# Patient Record
Sex: MALE | Race: BLACK OR AFRICAN AMERICAN | NOT HISPANIC OR LATINO | Employment: FULL TIME | URBAN - METROPOLITAN AREA
[De-identification: names, ages, dates, MRNs, and addresses within clinical notes are randomized per-mention and may not be internally consistent; named-entity substitution may affect disease eponyms.]

---

## 2019-07-27 ENCOUNTER — HOSPITAL ENCOUNTER (EMERGENCY)
Facility: OTHER | Age: 49
Discharge: HOME | End: 2019-07-27
Attending: EMERGENCY MEDICINE

## 2019-07-27 VITALS
WEIGHT: 190 LBS | SYSTOLIC BLOOD PRESSURE: 167 MMHG | OXYGEN SATURATION: 100 % | TEMPERATURE: 99 F | DIASTOLIC BLOOD PRESSURE: 81 MMHG | HEART RATE: 81 BPM | RESPIRATION RATE: 16 BRPM
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
## 2019-07-27 DIAGNOSIS — S81.852A DOG BITE OF LEFT LOWER LEG, INITIAL ENCOUNTER: Primary | ICD-10-CM
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
## 2019-07-27 PROCEDURE — 90715 TDAP VACCINE 7 YRS/> IM: CPT | Performed by: PHYSICIAN ASSISTANT

## 2019-07-27 PROCEDURE — 63600175 PHARM REV CODE 636 W HCPCS: Performed by: PHYSICIAN ASSISTANT

## 2019-07-27 PROCEDURE — 99283 EMERGENCY DEPT VISIT LOW MDM: CPT | Mod: 25

## 2019-07-27 PROCEDURE — 90471 IMMUNIZATION ADMIN: CPT | Performed by: PHYSICIAN ASSISTANT

## 2019-07-27 PROCEDURE — 25000003 PHARM REV CODE 250: Performed by: PHYSICIAN ASSISTANT

## 2019-07-27 RX ORDER — MUPIROCIN 20 MG/G
OINTMENT TOPICAL 3 TIMES DAILY
Qty: 22 G | Refills: 0 | Status: SHIPPED | OUTPATIENT
Start: 2019-07-27 | End: 2019-08-06

## 2019-07-27 RX ORDER — AMOXICILLIN AND CLAVULANATE POTASSIUM 875; 125 MG/1; MG/1
1 TABLET, FILM COATED ORAL
Status: COMPLETED | OUTPATIENT
Start: 2019-07-27 | End: 2019-07-27

## 2019-07-27 RX ORDER — AMOXICILLIN AND CLAVULANATE POTASSIUM 875; 125 MG/1; MG/1
1 TABLET, FILM COATED ORAL 2 TIMES DAILY
Qty: 14 TABLET | Refills: 0 | Status: SHIPPED | OUTPATIENT
Start: 2019-07-27 | End: 2019-08-03

## 2019-07-27 RX ADMIN — AMOXICILLIN AND CLAVULANATE POTASSIUM 1 TABLET: 875; 125 TABLET, FILM COATED ORAL at 07:07

## 2019-07-27 RX ADMIN — BACITRACIN ZINC, NEOMYCIN SULFATE, AND POLYMYXIN B SULFATE 1 EACH: 400; 3.5; 5 OINTMENT TOPICAL at 07:07

## 2019-07-27 RX ADMIN — CLOSTRIDIUM TETANI TOXOID ANTIGEN (FORMALDEHYDE INACTIVATED), CORYNEBACTERIUM DIPHTHERIAE TOXOID ANTIGEN (FORMALDEHYDE INACTIVATED), BORDETELLA PERTUSSIS TOXOID ANTIGEN (GLUTARALDEHYDE INACTIVATED), BORDETELLA PERTUSSIS FILAMENTOUS HEMAGGLUTININ ANTIGEN (FORMALDEHYDE INACTIVATED), BORDETELLA PERTUSSIS PERTACTIN ANTIGEN, AND BORDETELLA PERTUSSIS FIMBRIAE 2/3 ANTIGEN 0.5 ML: 5; 2; 2.5; 5; 3; 5 INJECTION, SUSPENSION INTRAMUSCULAR at 07:07

## 2019-07-27 NOTE — ED TRIAGE NOTES
"Pt presents to ER states "I was bit by a pit bull".  Pt reports being bit today in the Left calf around 3pm.  Pain 8/10.  Pt denies chest pain, sob, n/v/diarrhea.  "

## 2019-07-27 NOTE — ED PROVIDER NOTES
Encounter Date: 7/27/2019       History     Chief Complaint   Patient presents with    Animal Bite     two puncture wounds noted to left calf after getting bit by pit approx 3 hours PTA. Dog UTD on shots, pt not UTD on Tdap     49-year-old male with history of hypertension presents to the emergency department with his significant other with complaints of dog bite to the left calf.  He states that the injury occurred approximately 3 hr prior to arrival.  He states that he was in the woods at a friend's house smoking marijuana when his friend's pit bull bit the back of his leg.  He states that he was told that the dog's vaccines are up-to-date.  He admits that his tetanus vaccine is not up-to-date.  He admits applying liquid Band-Aid over the wound to stop the infection from getting in.  He reports pain at an 8/10. Patient denies any changes in the dog's behavior, foaming at the mouth. States that he is unsure why the dog bit him and unable to elaborate further on event    The history is provided by the patient and the spouse.     Review of patient's allergies indicates:  No Known Allergies  Past Medical History:   Diagnosis Date    Hypertension      History reviewed. No pertinent surgical history.  History reviewed. No pertinent family history.  Social History     Tobacco Use    Smoking status: Never Smoker   Substance Use Topics    Alcohol use: Yes    Drug use: Yes     Types: Marijuana     Review of Systems   Constitutional: Negative for chills and fever.   Respiratory: Negative for shortness of breath.    Cardiovascular: Negative for chest pain.   Genitourinary: Negative for dysuria.   Skin: Positive for wound (dog bite left calf). Negative for color change and rash.   Neurological: Negative for weakness and numbness.   Hematological: Does not bruise/bleed easily.       Physical Exam     Initial Vitals [07/27/19 1824]   BP Pulse Resp Temp SpO2   136/81 70 16 99 °F (37.2 °C) --      MAP       --          Physical Exam    Nursing note and vitals reviewed.  Constitutional: Vital signs are normal. He appears well-developed and well-nourished. He is not diaphoretic.  Non-toxic appearance. No distress.   HENT:   Head: Normocephalic and atraumatic.   Right Ear: External ear normal.   Left Ear: External ear normal.   Nose: Nose normal.   Eyes: Conjunctivae and lids are normal. No scleral icterus.   Neck: Phonation normal.   Cardiovascular: Normal rate, regular rhythm and intact distal pulses.   Abdominal: Normal appearance.   Musculoskeletal: Normal range of motion.   No obvious deformities, moving all extremities, normal gait   Neurological: He is alert and oriented to person, place, and time. He has normal strength. No sensory deficit.   Skin: Skin is warm, dry and intact. Capillary refill takes less than 2 seconds. No abrasion, no bruising, no ecchymosis, no lesion and no rash noted. No erythema.        2 puncture wounds noted to the left calf with overlying dried liquid bandaid. No surrounding erythema, edema or ecchymosis. No TTP   Psychiatric: He has a normal mood and affect. His speech is normal and behavior is normal. Judgment normal. Cognition and memory are normal.         ED Course   Procedures  Labs Reviewed - No data to display       Imaging Results          X-Ray Tibia Fibula 2 View Left (Final result)  Result time 07/27/19 19:17:15    Final result by Rolando Guillen MD (07/27/19 19:17:15)                 Impression:      As above.      Electronically signed by: Rolando Guillen MD  Date:    07/27/2019  Time:    19:17             Narrative:    EXAMINATION:  XR TIBIA FIBULA 2 VIEW LEFT    CLINICAL HISTORY:  Bitten by dog, initial encounter    TECHNIQUE:  AP and lateral views of the left tibia and fibula were performed.    COMPARISON:  None.    FINDINGS:  The bone mineralization is within normal limits.  No cortical step-off is identified.  There is no evidence of periostitis.    The joint spaces are maintained.   No significant joint effusion is present.  There is a small radiopaque density within the subcutaneous tissues of the medial aspect of the left lower extremity.  No soft tissue gas is present.    There is no evidence of a fracture or dislocation.                                 Medical Decision Making:   History:   Old Medical Records: I decided to obtain old medical records.  Initial Assessment:   49-year-old male with complaints consistent with dog bite to the left lower leg.  Vital signs stable, afebrile, neurovascularly intact.  He is alert and healthy and nontoxic appearing.  He is not distressed.  Exam documented above.  Two small puncture wounds noted to the left calf.  Patient has overlying liquid Band-Aid applied to the wound.  He states that he was told that the dog's vaccines are up-to-date however he is not up-to-date on his tetanus vaccine.  Clinical Tests:   Radiological Study: Ordered and Reviewed  ED Management:  X-ray obtained.  There is no evidence of fracture or dislocation.  No soft tissue gas present.  On the AP view of the tib-fib, radiopaque foreign body is present however it does not appear to be within the soft tissue.  It appears to be external.  Not visualized on lateral views.  The wound was irrigated and cleaned extensively in the emergency department.  Antibiotic ointment and dressing was applied.  He was administered Tdap and oral Augmentin.  Will discharge home with care instructions as well as a prescription for Augmentin for prophylactic coverage.  He was also given a prescription for mupirocin topical ointment.  He is to follow up with primary care given information for [facility].  He is urged to return for any worsening signs or symptoms otherwise.  He states understanding agrees with this plan.  This is the extent of patient's complaints today.  This note was created using [employer] dictation.  There may be typographical errors secondary to dictation.                         Clinical Impression:     1. Dog bite of left lower leg, initial encounter    2. Dog bite            Disposition:   Disposition: Discharged  Condition: Stable                        Laurie Means PA-C  07/27/19 1948